# Patient Record
Sex: MALE | Race: BLACK OR AFRICAN AMERICAN | NOT HISPANIC OR LATINO | Employment: UNEMPLOYED | URBAN - METROPOLITAN AREA
[De-identification: names, ages, dates, MRNs, and addresses within clinical notes are randomized per-mention and may not be internally consistent; named-entity substitution may affect disease eponyms.]

---

## 2023-05-11 ENCOUNTER — HOSPITAL ENCOUNTER (EMERGENCY)
Facility: HOSPITAL | Age: 33
Discharge: HOME | End: 2023-05-11
Attending: EMERGENCY MEDICINE
Payer: MEDICAID

## 2023-05-11 VITALS
HEIGHT: 61 IN | SYSTOLIC BLOOD PRESSURE: 141 MMHG | WEIGHT: 230 LBS | RESPIRATION RATE: 18 BRPM | DIASTOLIC BLOOD PRESSURE: 96 MMHG | OXYGEN SATURATION: 98 % | BODY MASS INDEX: 43.43 KG/M2 | TEMPERATURE: 98 F | HEART RATE: 88 BPM

## 2023-05-11 DIAGNOSIS — N39.0 URINARY TRACT INFECTION WITHOUT HEMATURIA, SITE UNSPECIFIED: ICD-10-CM

## 2023-05-11 DIAGNOSIS — N50.89 TESTICULAR SWELLING: ICD-10-CM

## 2023-05-11 DIAGNOSIS — N49.2 CELLULITIS OF SCROTUM: Primary | ICD-10-CM

## 2023-05-11 LAB
BILIRUB UR QL STRIP: NEGATIVE
BILIRUB UR QL STRIP: NEGATIVE
CLARITY UR: CLEAR
CLARITY UR: CLEAR
COLOR UR: COLORLESS
COLOR UR: COLORLESS
GLUCOSE UR QL STRIP: NEGATIVE
GLUCOSE UR QL STRIP: NEGATIVE
HGB UR QL STRIP: NEGATIVE
HGB UR QL STRIP: NEGATIVE
KETONES UR QL STRIP: NEGATIVE
KETONES UR QL STRIP: NEGATIVE
LEUKOCYTE ESTERASE UR QL STRIP: ABNORMAL
LEUKOCYTE ESTERASE UR QL STRIP: ABNORMAL
MICROSCOPIC COMMENT: ABNORMAL
NITRITE UR QL STRIP: NEGATIVE
NITRITE UR QL STRIP: NEGATIVE
PH UR STRIP: 6 [PH] (ref 5–8)
PH UR STRIP: 6 [PH] (ref 5–8)
PROT UR QL STRIP: NEGATIVE
PROT UR QL STRIP: NEGATIVE
SP GR UR STRIP: <1.005 (ref 1–1.03)
SP GR UR STRIP: <1.005 (ref 1–1.03)
URN SPEC COLLECT METH UR: ABNORMAL
URN SPEC COLLECT METH UR: ABNORMAL
UROBILINOGEN UR STRIP-ACNC: NEGATIVE EU/DL
UROBILINOGEN UR STRIP-ACNC: NEGATIVE EU/DL
WBC #/AREA URNS HPF: 12 /HPF (ref 0–5)
WBC CLUMPS URNS QL MICRO: ABNORMAL

## 2023-05-11 PROCEDURE — 81000 URINALYSIS NONAUTO W/SCOPE: CPT | Performed by: EMERGENCY MEDICINE

## 2023-05-11 PROCEDURE — 87086 URINE CULTURE/COLONY COUNT: CPT | Performed by: EMERGENCY MEDICINE

## 2023-05-11 PROCEDURE — 99285 EMERGENCY DEPT VISIT HI MDM: CPT

## 2023-05-11 PROCEDURE — 87591 N.GONORRHOEAE DNA AMP PROB: CPT | Performed by: EMERGENCY MEDICINE

## 2023-05-11 PROCEDURE — 96372 THER/PROPH/DIAG INJ SC/IM: CPT

## 2023-05-11 PROCEDURE — 63600175 PHARM REV CODE 636 W HCPCS

## 2023-05-11 RX ORDER — CEFTRIAXONE 500 MG/1
500 INJECTION, POWDER, FOR SOLUTION INTRAMUSCULAR; INTRAVENOUS
Status: COMPLETED | OUTPATIENT
Start: 2023-05-11 | End: 2023-05-11

## 2023-05-11 RX ORDER — CEPHALEXIN 500 MG/1
500 CAPSULE ORAL 4 TIMES DAILY
Qty: 28 CAPSULE | Refills: 0 | Status: SHIPPED | OUTPATIENT
Start: 2023-05-11 | End: 2023-05-18

## 2023-05-11 RX ORDER — IBUPROFEN 600 MG/1
600 TABLET ORAL EVERY 6 HOURS PRN
Qty: 20 TABLET | Refills: 0 | Status: SHIPPED | OUTPATIENT
Start: 2023-05-11

## 2023-05-11 RX ORDER — ACETAMINOPHEN 500 MG
500 TABLET ORAL EVERY 6 HOURS PRN
Qty: 20 TABLET | Refills: 0 | Status: SHIPPED | OUTPATIENT
Start: 2023-05-11

## 2023-05-11 RX ADMIN — CEFTRIAXONE 500 MG: 500 INJECTION, POWDER, FOR SOLUTION INTRAMUSCULAR; INTRAVENOUS at 11:05

## 2023-05-11 NOTE — Clinical Note
"Tulio Haleuyen Egan was seen and treated in our emergency department on 5/11/2023.  He may return to work on 05/15/2023.       If you have any questions or concerns, please don't hesitate to call.      Alayna Holdsworth, PA-C"

## 2023-05-11 NOTE — ED PROVIDER NOTES
"Encounter Date: 5/11/2023    SCRIBE #1 NOTE: I, Cinthya Cande Hatch, am scribing for, and in the presence of,  Alayna Holdsworth, PA-C. I have scribed the following portions of the note - Other sections scribed: HPI, ROS.     History     Chief Complaint   Patient presents with    Cyst     Pt c/o cyst to right scrotum x 3-4 days. Pt states he attempted to drain cyst yesterday with no relief. Pt reports urinary urgency     32 yr old male with no pertinent PMHx presents to the ED for a chief complaint of cyst to right scrotum onset 4 days ago. Patient describes the pain as a "throbbing pain" and rates the pain 8/10. The pain is exacerbating with movement. Patient states he attempted to drain cyst yesterday with no relief. No other attempted treatments for the symptom. Patient reports he has experiences similar symptoms before from cysts in the past. Patient reports current swelling of testicles bilaterally, and swelling with alleviation PTA of his genital. Patient endorses frequency and urgent urination, and purulent once. Patient denies headache, abdominal pain,  nausea, vomiting, diarrhea, chest pain, shortness of breath, dysuria, decreased urine, fever, dizziness, lightheadedness, or other associated symptoms. No other alleviating or exacerbating factors. NKDA.         The history is provided by the patient. No  was used.   Review of patient's allergies indicates:  No Known Allergies  History reviewed. No pertinent past medical history.  History reviewed. No pertinent surgical history.  History reviewed. No pertinent family history.     Review of Systems   Constitutional:  Negative for chills, fatigue and fever.   Respiratory:  Negative for shortness of breath.    Cardiovascular:  Negative for chest pain.   Gastrointestinal:  Negative for abdominal pain, constipation, diarrhea, nausea and vomiting.   Genitourinary:  Positive for frequency, scrotal swelling (bilateral) and urgency. Negative for " decreased urine volume, difficulty urinating, dysuria, penile discharge, penile pain, penile swelling and testicular pain.        (+) purulent   Musculoskeletal:  Negative for back pain and myalgias.   Skin:  Negative for rash.   Neurological:  Negative for dizziness, weakness, light-headedness and headaches.     Physical Exam     Initial Vitals [05/11/23 0900]   BP Pulse Resp Temp SpO2   (!) 157/101 99 18 98.7 °F (37.1 °C) 100 %      MAP       --         Physical Exam    Nursing note and vitals reviewed.  Constitutional: He appears well-developed and well-nourished. He is not diaphoretic. He is active. He does not appear ill. No distress.   HENT:   Head: Normocephalic and atraumatic.   Right Ear: External ear normal.   Left Ear: External ear normal.   Nose: Nose normal.   Eyes: Conjunctivae, EOM and lids are normal. Pupils are equal, round, and reactive to light. Right eye exhibits no discharge. Left eye exhibits no discharge. No scleral icterus.   Neck: Neck supple.   Normal range of motion.   Full passive range of motion without pain.     Cardiovascular:  Normal rate and regular rhythm.           Pulmonary/Chest: Effort normal and breath sounds normal. No respiratory distress.   Abdominal: Abdomen is soft. He exhibits no distension. There is no abdominal tenderness.   Genitourinary:    Penis normal.   Right testis shows swelling and tenderness (open wound to right scrotum with no palpated abscess or fluid collection). Left testis shows swelling. Left testis shows no tenderness. Uncircumcised. No penile erythema or penile tenderness. No discharge found.       Musculoskeletal:         General: Normal range of motion.      Cervical back: Full passive range of motion without pain, normal range of motion and neck supple.     Neurological: He is alert and oriented to person, place, and time. He has normal strength. No sensory deficit. GCS eye subscore is 4. GCS verbal subscore is 5. GCS motor subscore is 6.   Skin:  Skin is dry. Capillary refill takes less than 2 seconds.       ED Course   Procedures  Labs Reviewed   URINALYSIS, REFLEX TO URINE CULTURE - Abnormal; Notable for the following components:       Result Value    Color, UA Colorless (*)     Specific Gravity, UA <1.005 (*)     Leukocytes, UA 2+ (*)     All other components within normal limits    Narrative:     Specimen Source->Urine   URINALYSIS, REFLEX TO URINE CULTURE - Abnormal; Notable for the following components:    Color, UA Colorless (*)     Specific Gravity, UA <1.005 (*)     Leukocytes, UA 2+ (*)     All other components within normal limits    Narrative:     Specimen Source->Urine   URINALYSIS MICROSCOPIC - Abnormal; Notable for the following components:    WBC, UA 12 (*)     All other components within normal limits    Narrative:     Specimen Source->Urine   C. TRACHOMATIS/N. GONORRHOEAE BY AMP DNA   CULTURE, URINE   C. TRACHOMATIS/N. GONORRHOEAE BY AMP DNA          Imaging Results              US Scrotum And Testicles (Final result)  Result time 05/11/23 12:20:09      Final result by Thiago Lundy MD (05/11/23 12:20:09)                   Impression:      Diffuse scrotal wall thickening, correlation is advised, may reflect cellulitis.    No findings to suggest epididymo-orchitis.    Small bilateral varicocele.      Electronically signed by: Thiago Lundy MD  Date:    05/11/2023  Time:    12:20               Narrative:    EXAMINATION:  US SCROTUM AND TESTICLES    CLINICAL HISTORY:  Other specified disorders of the male genital organs    TECHNIQUE:  Sonography of the scrotum and testes.    COMPARISON:  None.    FINDINGS:  The right testicle measures approximately 3.0 x 3.0 x 4.5 cm with homogeneous echotexture.  Arterial and venous flow is documented to the right testicle without abnormal testicular or epididymal hyperemia.  The right epididymis is unremarkable.  There is a small right varicocele.  No right hydrocele.    The left testicle measures  approximately 3.4 x 3.3 x 4.7 cm with homogeneous echotexture.  Arterial and venous flow is documented to the left testicle without abnormal testicular or epididymal hyperemia.  The left epididymis is unremarkable.  There is a small left varicocele.  No left hydrocele.    There is diffuse scrotal wall thickening and wall hyperemia.                                       Medications   cefTRIAXone injection 500 mg (500 mg Intramuscular Given 5/11/23 1158)     Medical Decision Making:   History:   Old Medical Records: I decided to obtain old medical records.  Initial Assessment:   32 yr old male with no pertinent PMHx presents to the ED for a chief complaint of cyst to right scrotum.  Patient's chart and medical history reviewed.  Differential Diagnosis:   UTI  Gonorrhea  Chlamydia  Epididymitis   Testicular torsion   Hydrocele  Variocele  Hernia  Cyst  Abscess  Clinical Tests:   Lab Tests: Ordered and Reviewed  Radiological Study: Reviewed and Ordered  ED Management:  Patient's vitals reviewed.  He is afebrile, no respiratory distress, nontoxic-appearing in the ED. Patient had bilateral testicular swelling with a right-sided open wound on his scrotum.  No fluid collection or abscess could be palpated. Penis was normal.  Patient denied pain medication. UA was remarkable for UTI. Patient will be sent home on Keflex.  Discussed with him a urine culture will be performed and if anything grows that is not covered by this antibiotic he will be called and an appropriate antibiotic will be prescribed.  He verbalized understanding.  GC pending.  Patient given Rocephin in the ED. Testicular ultrasound showed Diffuse scrotal wall thickening, correlation is advised, may reflect cellulitis. No findings to suggest epididymo-orchitis. Small bilateral varicocele.  Discussed this case with Dr. Esquivel.  We will take patient for scrotal cellulitis.  Patient will be sent home with Keflex.  Patient will also be sent home on Motrin and  Tylenol as needed for pain.  Instructed patient to do warm compresses to promote further drainage and ice to help with swelling, he verbalized understanding.  Instructed patient to keep the area clean and dry and watch out for worsening signs of infection including erythema, warmth, pus discharge, and fevers at home. Patient agrees with this plan. Discussed with him strict return precautions, he verbalized understanding. Patient is stable for discharge.           Scribe Attestation:   Scribe #1: I performed the above scribed service and the documentation accurately describes the services I performed. I attest to the accuracy of the note.                 ICinthya, scribed for, and in the presence of, Alayna Holdsworth,PA-C. I performed the scribed service and the documentation accurately describes the services I performed. I attest to the accuracy of the note.    Clinical Impression:   Final diagnoses:  [N50.89] Testicular swelling  [N49.2] Cellulitis of scrotum (Primary)  [N39.0] Urinary tract infection without hematuria, site unspecified        ED Disposition Condition    Discharge Stable          ED Prescriptions       Medication Sig Dispense Start Date End Date Auth. Provider    ibuprofen (ADVIL,MOTRIN) 600 MG tablet Take 1 tablet (600 mg total) by mouth every 6 (six) hours as needed for Pain. 20 tablet 5/11/2023 -- Alayna Holdsworth, PA-C    acetaminophen (TYLENOL) 500 MG tablet Take 1 tablet (500 mg total) by mouth every 6 (six) hours as needed for Temperature greater than or Pain. 20 tablet 5/11/2023 -- Alayna Holdsworth, PA-C    cephALEXin (KEFLEX) 500 MG capsule Take 1 capsule (500 mg total) by mouth 4 (four) times daily. for 7 days 28 capsule 5/11/2023 5/18/2023 Alayna Holdsworth, PA-C          Follow-up Information       Follow up With Specialties Details Why Contact Info    [facility] [address]  [place] [address]  [phone]               Alayna Holdsworth, PA-C  05/11/23  [id number]

## 2023-05-11 NOTE — DISCHARGE INSTRUCTIONS

## 2023-05-11 NOTE — ED TRIAGE NOTES
Pt c/o cyst to right scrotum x 3-4 days. Pt states he attempted to drain cyst yesterday with no relief. Pt reports urinary urgency

## 2023-05-12 LAB
C TRACH DNA SPEC QL NAA+PROBE: NOT DETECTED
N GONORRHOEA DNA SPEC QL NAA+PROBE: NOT DETECTED

## 2023-05-13 LAB — BACTERIA UR CULT: NO GROWTH

## 2023-10-19 ENCOUNTER — OFFICE VISIT (OUTPATIENT)
Dept: FAMILY MEDICINE | Facility: CLINIC | Age: 33
End: 2023-10-19
Payer: COMMERCIAL

## 2023-10-19 VITALS
TEMPERATURE: 99 F | SYSTOLIC BLOOD PRESSURE: 154 MMHG | WEIGHT: 231.25 LBS | BODY MASS INDEX: 43.7 KG/M2 | DIASTOLIC BLOOD PRESSURE: 104 MMHG | OXYGEN SATURATION: 95 % | HEART RATE: 94 BPM

## 2023-10-19 DIAGNOSIS — Z11.4 ENCOUNTER FOR SCREENING FOR HIV: ICD-10-CM

## 2023-10-19 DIAGNOSIS — L02.214 GROIN ABSCESS: Primary | ICD-10-CM

## 2023-10-19 DIAGNOSIS — L73.2 HIDRADENITIS SUPPURATIVA OF MULTIPLE SITES: ICD-10-CM

## 2023-10-19 DIAGNOSIS — Z11.59 ENCOUNTER FOR HCV SCREENING TEST FOR LOW RISK PATIENT: ICD-10-CM

## 2023-10-19 DIAGNOSIS — Z11.3 ROUTINE SCREENING FOR STI (SEXUALLY TRANSMITTED INFECTION): ICD-10-CM

## 2023-10-19 PROCEDURE — 3077F SYST BP >= 140 MM HG: CPT | Mod: CPTII,S$GLB,, | Performed by: STUDENT IN AN ORGANIZED HEALTH CARE EDUCATION/TRAINING PROGRAM

## 2023-10-19 PROCEDURE — 1159F PR MEDICATION LIST DOCUMENTED IN MEDICAL RECORD: ICD-10-PCS | Mod: CPTII,S$GLB,, | Performed by: STUDENT IN AN ORGANIZED HEALTH CARE EDUCATION/TRAINING PROGRAM

## 2023-10-19 PROCEDURE — 3080F PR MOST RECENT DIASTOLIC BLOOD PRESSURE >= 90 MM HG: ICD-10-PCS | Mod: CPTII,S$GLB,, | Performed by: STUDENT IN AN ORGANIZED HEALTH CARE EDUCATION/TRAINING PROGRAM

## 2023-10-19 PROCEDURE — 1159F MED LIST DOCD IN RCRD: CPT | Mod: CPTII,S$GLB,, | Performed by: STUDENT IN AN ORGANIZED HEALTH CARE EDUCATION/TRAINING PROGRAM

## 2023-10-19 PROCEDURE — 3080F DIAST BP >= 90 MM HG: CPT | Mod: CPTII,S$GLB,, | Performed by: STUDENT IN AN ORGANIZED HEALTH CARE EDUCATION/TRAINING PROGRAM

## 2023-10-19 PROCEDURE — 3008F PR BODY MASS INDEX (BMI) DOCUMENTED: ICD-10-PCS | Mod: CPTII,S$GLB,, | Performed by: STUDENT IN AN ORGANIZED HEALTH CARE EDUCATION/TRAINING PROGRAM

## 2023-10-19 PROCEDURE — 99999 PR PBB SHADOW E&M-EST. PATIENT-LVL III: CPT | Mod: PBBFAC,,, | Performed by: STUDENT IN AN ORGANIZED HEALTH CARE EDUCATION/TRAINING PROGRAM

## 2023-10-19 PROCEDURE — 99204 PR OFFICE/OUTPT VISIT, NEW, LEVL IV, 45-59 MIN: ICD-10-PCS | Mod: S$GLB,,, | Performed by: STUDENT IN AN ORGANIZED HEALTH CARE EDUCATION/TRAINING PROGRAM

## 2023-10-19 PROCEDURE — 3008F BODY MASS INDEX DOCD: CPT | Mod: CPTII,S$GLB,, | Performed by: STUDENT IN AN ORGANIZED HEALTH CARE EDUCATION/TRAINING PROGRAM

## 2023-10-19 PROCEDURE — 99999 PR PBB SHADOW E&M-EST. PATIENT-LVL III: ICD-10-PCS | Mod: PBBFAC,,, | Performed by: STUDENT IN AN ORGANIZED HEALTH CARE EDUCATION/TRAINING PROGRAM

## 2023-10-19 PROCEDURE — 3077F PR MOST RECENT SYSTOLIC BLOOD PRESSURE >= 140 MM HG: ICD-10-PCS | Mod: CPTII,S$GLB,, | Performed by: STUDENT IN AN ORGANIZED HEALTH CARE EDUCATION/TRAINING PROGRAM

## 2023-10-19 PROCEDURE — 99204 OFFICE O/P NEW MOD 45 MIN: CPT | Mod: S$GLB,,, | Performed by: STUDENT IN AN ORGANIZED HEALTH CARE EDUCATION/TRAINING PROGRAM

## 2023-10-19 RX ORDER — DOXYCYCLINE 100 MG/1
100 CAPSULE ORAL 2 TIMES DAILY
Qty: 14 CAPSULE | Refills: 0 | Status: SHIPPED | OUTPATIENT
Start: 2023-10-19 | End: 2023-10-26

## 2023-10-19 RX ORDER — UREA 40 %
CREAM (GRAM) TOPICAL 2 TIMES DAILY
COMMUNITY
Start: 2023-07-02

## 2023-10-19 RX ORDER — MUPIROCIN 20 MG/G
OINTMENT TOPICAL 3 TIMES DAILY
Qty: 30 G | Refills: 0 | Status: SHIPPED | OUTPATIENT
Start: 2023-10-19 | End: 2023-11-18

## 2023-10-19 RX ORDER — BUPROPION HYDROCHLORIDE 150 MG/1
TABLET, EXTENDED RELEASE ORAL
COMMUNITY
Start: 2023-04-12

## 2023-10-19 RX ORDER — TERBINAFINE HYDROCHLORIDE 250 MG/1
250 TABLET ORAL
COMMUNITY
Start: 2023-10-18

## 2023-10-19 NOTE — PROGRESS NOTES
Health Maintenance Due   Topic     Hepatitis C Screening  Consult pcp    COVID-19 Vaccine (1) Not offered at this office    HIV Screening  Consult pcp    TETANUS VACCINE  Pt decline    Influenza Vaccine (1) Pt decline

## 2023-10-19 NOTE — PROGRESS NOTES
SUBJECTIVE     Chief Complaint   Patient presents with    Cyst     Have been having cysts on skin all over for years and don't know where they are coming from    Establish Care       HPI  Tulio Egan is a 32 y.o. male with no significant past medical history that presents for establishment of care and evaluation of cysts.    Pt reports recurrent grin abscesses. He gets them on his scrotum and occasionally his gluteal crease, once his armpit.     Normal labs in January of this year.   Works at [employer]. Stays with girlfriend.     PAST MEDICAL HISTORY:  No past medical history on file.    ALLERGIES AND MEDICATIONS: updated and reviewed.  Review of patient's allergies indicates:  No Known Allergies  Current Outpatient Medications   Medication Sig Dispense Refill    acetaminophen (TYLENOL) 500 MG tablet Take 1 tablet (500 mg total) by mouth every 6 (six) hours as needed for Temperature greater than or Pain. 20 tablet 0    ibuprofen (ADVIL,MOTRIN) 600 MG tablet Take 1 tablet (600 mg total) by mouth every 6 (six) hours as needed for Pain. 20 tablet 0    terbinafine HCL (LAMISIL) 250 mg tablet Take 250 mg by mouth.      urea (CARMOL) 40 % Crea Apply topically 2 (two) times daily.      buPROPion (WELLBUTRIN SR) 150 MG TBSR 12 hr tablet Take one tab daily for three days then twice daily x 7-12 weeks      doxycycline (VIBRAMYCIN) 100 MG Cap Take 1 capsule (100 mg total) by mouth 2 (two) times daily. for 7 days 14 capsule 0    mupirocin (BACTROBAN) 2 % ointment Apply topically 3 (three) times daily. 30 g 0     No current facility-administered medications for this visit.       ROS  Review of Systems   Constitutional:  Negative for chills, fatigue and fever.   HENT:  Negative for rhinorrhea and sore throat.    Respiratory:  Negative for cough and shortness of breath.    Cardiovascular:  Negative for chest pain and palpitations.   Gastrointestinal:  Negative for constipation, diarrhea, nausea and vomiting.   Genitourinary:   Negative for dysuria.   Musculoskeletal:  Negative for joint swelling.   Skin:  Negative for rash and wound.        Abscess     Neurological:  Negative for light-headedness and headaches.   Psychiatric/Behavioral:  Negative for dysphoric mood and sleep disturbance. The patient is not nervous/anxious.          OBJECTIVE     Physical Exam  Vitals:    10/19/23 1530   BP: (!) 154/104   Pulse: 94   Temp: 98.9 °F (37.2 °C)    Body mass index is 43.7 kg/m².  Weight: 104.9 kg (231 lb 4.2 oz)         Physical Exam  Vitals reviewed.   Constitutional:       General: He is not in acute distress.  HENT:      Right Ear: External ear normal.      Left Ear: External ear normal.      Nose: Nose normal.      Mouth/Throat:      Mouth: Mucous membranes are moist.   Eyes:      Extraocular Movements: Extraocular movements intact.      Conjunctiva/sclera: Conjunctivae normal.      Pupils: Pupils are equal, round, and reactive to light.   Pulmonary:      Effort: Pulmonary effort is normal.   Abdominal:      General: There is no distension.      Palpations: Abdomen is soft.   Musculoskeletal:         General: No swelling. Normal range of motion.      Cervical back: Normal range of motion.   Skin:     General: Skin is warm and dry.      Findings: Lesion present. No rash.   Neurological:      General: No focal deficit present.      Mental Status: He is alert and oriented to person, place, and time.   Psychiatric:         Mood and Affect: Mood normal.         Behavior: Behavior normal.           Health Maintenance         Date Due Completion Date    Hepatitis C Screening Never done ---    COVID-19 Vaccine (1) Never done ---    Pneumococcal Vaccines (Age 0-64) (1 - PCV) Never done ---    HIV Screening Never done ---    TETANUS VACCINE Never done ---    Influenza Vaccine (1) Never done ---              ASSESSMENT     32 y.o. male with     1. Groin abscess    2. Hidradenitis suppurativa of multiple sites    3. Encounter for screening for HIV     4. Encounter for HCV screening test for low risk patient    5. Routine screening for STI (sexually transmitted infection)        PLAN:     1. Groin abscess  - New. Start doxy and warm compresses. F/u 1 week if not resolved.  - Hemoglobin A1C; Future  - Lipid Panel; Future  - TSH; Future  - Comprehensive Metabolic Panel; Future  - CBC Auto Differential; Future  - HIV 1/2 Ag/Ab (4th Gen); Future  - Hepatitis C Antibody; Future  - C. trachomatis/N. gonorrhoeae by AMP DNA [facility]; Urine; Future  - RPR; Future  - Hepatitis B Surface Antigen; Future  - doxycycline (VIBRAMYCIN) 100 MG Cap; Take 1 capsule (100 mg total) by mouth 2 (two) times daily. for 7 days  Dispense: 14 capsule; Refill: 0  - mupirocin (BACTROBAN) 2 % ointment; Apply topically 3 (three) times daily.  Dispense: 30 g; Refill: 0    2. Hidradenitis suppurativa of multiple sites  - New diagnosis. Start mupirocin PRN and warm compresses. RTC if worsening and consider ppx.     3. Encounter for screening for HIV  - Due, ordered.  - HIV 1/2 Ag/Ab (4th Gen); Future    4. Encounter for HCV screening test for low risk patient  - Due, ordered.  - Hepatitis C Antibody; Future    5. Routine screening for STI (sexually transmitted infection)  - Due, ordered.  - HIV 1/2 Ag/Ab (4th Gen); Future  - Hepatitis C Antibody; Future  - C. trachomatis/N. gonorrhoeae by AMP DNA [facility]; Urine; Future  - RPR; Future  - Hepatitis B Surface Antigen; Future        Jocelyn Vincent MD  10/19/2023 3:38 PM        Follow up in about 3 months (around 1/19/2024).

## 2024-05-09 ENCOUNTER — OFFICE VISIT (OUTPATIENT)
Dept: FAMILY MEDICINE | Facility: CLINIC | Age: 34
End: 2024-05-09
Payer: COMMERCIAL

## 2024-05-09 VITALS
SYSTOLIC BLOOD PRESSURE: 134 MMHG | DIASTOLIC BLOOD PRESSURE: 78 MMHG | RESPIRATION RATE: 16 BRPM | HEIGHT: 61 IN | HEART RATE: 96 BPM | OXYGEN SATURATION: 96 % | WEIGHT: 232.13 LBS | BODY MASS INDEX: 43.83 KG/M2 | TEMPERATURE: 99 F

## 2024-05-09 DIAGNOSIS — B35.1 ONYCHOMYCOSIS: ICD-10-CM

## 2024-05-09 DIAGNOSIS — Z11.3 SCREEN FOR STD (SEXUALLY TRANSMITTED DISEASE): ICD-10-CM

## 2024-05-09 DIAGNOSIS — Z00.00 ANNUAL PHYSICAL EXAM: Primary | ICD-10-CM

## 2024-05-09 DIAGNOSIS — Z23 NEED FOR PNEUMOCOCCAL 20-VALENT CONJUGATE VACCINATION: ICD-10-CM

## 2024-05-09 DIAGNOSIS — Z23 NEED FOR DIPHTHERIA-TETANUS-PERTUSSIS (TDAP) VACCINE: ICD-10-CM

## 2024-05-09 DIAGNOSIS — Z11.59 NEED FOR HEPATITIS C SCREENING TEST: ICD-10-CM

## 2024-05-09 DIAGNOSIS — Z72.0 TOBACCO USE: ICD-10-CM

## 2024-05-09 PROCEDURE — 90471 IMMUNIZATION ADMIN: CPT | Mod: S$GLB,,, | Performed by: FAMILY MEDICINE

## 2024-05-09 PROCEDURE — 3078F DIAST BP <80 MM HG: CPT | Mod: CPTII,S$GLB,, | Performed by: FAMILY MEDICINE

## 2024-05-09 PROCEDURE — 3008F BODY MASS INDEX DOCD: CPT | Mod: CPTII,S$GLB,, | Performed by: FAMILY MEDICINE

## 2024-05-09 PROCEDURE — 90677 PCV20 VACCINE IM: CPT | Mod: S$GLB,,, | Performed by: FAMILY MEDICINE

## 2024-05-09 PROCEDURE — 99406 BEHAV CHNG SMOKING 3-10 MIN: CPT | Mod: 25,S$GLB,, | Performed by: FAMILY MEDICINE

## 2024-05-09 PROCEDURE — 90715 TDAP VACCINE 7 YRS/> IM: CPT | Mod: S$GLB,,, | Performed by: FAMILY MEDICINE

## 2024-05-09 PROCEDURE — 99999 PR PBB SHADOW E&M-EST. PATIENT-LVL III: CPT | Mod: PBBFAC,,, | Performed by: FAMILY MEDICINE

## 2024-05-09 PROCEDURE — 90472 IMMUNIZATION ADMIN EACH ADD: CPT | Mod: S$GLB,,, | Performed by: FAMILY MEDICINE

## 2024-05-09 PROCEDURE — 99395 PREV VISIT EST AGE 18-39: CPT | Mod: 25,S$GLB,, | Performed by: FAMILY MEDICINE

## 2024-05-09 PROCEDURE — 3075F SYST BP GE 130 - 139MM HG: CPT | Mod: CPTII,S$GLB,, | Performed by: FAMILY MEDICINE

## 2024-05-09 RX ORDER — IBUPROFEN 800 MG/1
800 TABLET ORAL EVERY 6 HOURS PRN
COMMUNITY
Start: 2024-01-04

## 2024-05-09 RX ORDER — VARENICLINE TARTRATE 0.5 (11)-1
KIT ORAL
Qty: 1 EACH | Refills: 0 | Status: SHIPPED | OUTPATIENT
Start: 2024-05-09

## 2024-05-09 NOTE — PROGRESS NOTES
Administered Tdap IM to right deltoid.  Patient tolerated injection well, no adverse reactions noted.   Administered Pneumococcal 20 vaccine IM to right deltoid.  Patient tolerated injection well, no adverse reactions noted.

## 2024-05-09 NOTE — PROGRESS NOTES
Health Maintenance Due   Topic     Hepatitis C Screening  CONSULT WITH PCP    Pneumococcal Vaccines (Age 0-64) (1 of 2 - PCV) PATIENT DECLINE     HIV Screening  CONSULT WITH PCP    TETANUS VACCINE  CONSULT WITH PCP    COVID-19 Vaccine (1 - 2023-24 season) Not given at this facility

## 2024-05-20 ENCOUNTER — CLINICAL SUPPORT (OUTPATIENT)
Dept: SMOKING CESSATION | Facility: CLINIC | Age: 34
End: 2024-05-20
Payer: COMMERCIAL

## 2024-05-20 DIAGNOSIS — F17.200 NICOTINE DEPENDENCE: Primary | ICD-10-CM

## 2024-05-20 PROCEDURE — 99999 PR PBB SHADOW E&M-EST. PATIENT-LVL I: CPT | Mod: PBBFAC,,,

## 2024-05-20 RX ORDER — DM/P-EPHED/ACETAMINOPH/DOXYLAM 30-7.5/3
2 LIQUID (ML) ORAL
Qty: 72 LOZENGE | Refills: 0 | Status: SHIPPED | OUTPATIENT
Start: 2024-05-20

## 2024-05-20 NOTE — PROGRESS NOTES
Patient was seen in clinic today and reports smoking 1-1.5 packs of cigarettes per day. He states that he is already on a tobacco cessation medication regimen of 1mg Chantix bid, given to him by his physician. He will also begin using 2mg nicotine lozenges. Patient reports not smoking in his home but does smoke outside, oftentimes chain smoking. Suggested that patient only take one cigarette out with him at a time and waiting at least one hour between each cigarettes. Encouraged patient to only purchase one pack of cigarettes at a time. Discussed other tips and strategies to assist with quit. The patient will continue with  therapy sessions and medication monitoring by [facility]. Prescribed medication management will be by physician.

## 2024-05-31 NOTE — PROGRESS NOTES
Subjective:       Patient ID: Tulio Egan is a 33 y.o. male.    Chief Complaint: No chief complaint on file.      HPI  33-year-old male presents for annual exam states he has discoloration in his nail.    Review of Systems   Constitutional: Negative.    HENT: Negative.     Respiratory: Negative.     Cardiovascular: Negative.    Gastrointestinal: Negative.    Endocrine: Negative.    Genitourinary: Negative.    Musculoskeletal: Negative.    Neurological: Negative.    Psychiatric/Behavioral: Negative.            History reviewed. No pertinent past medical history.  History reviewed. No pertinent surgical history.  No family history on file.  Social History     Socioeconomic History    Marital status: Single   Tobacco Use    Smoking status: Every Day     Types: Cigarettes     Passive exposure: Never    Smokeless tobacco: Never     Social Determinants of Health     Financial Resource Strain: Low Risk  (3/20/2023)    [place] [facility]    Overall Financial Resource Strain (CARDIA)     Difficulty of Paying Living Expenses: Not hard at all   Food Insecurity: No Food Insecurity (3/20/2023)    Received [place] [facility]    Hunger Vital Sign     Worried About Running Out of Food in the Last Year: Never true     Ran Out of Food in the Last Year: Never true   Transportation Needs: No Transportation Needs (3/20/2023)    Received from [facility]    PRAPARE - Transportation     Lack of Transportation (Medical): No     Lack of Transportation (Non-Medical): No   Physical Activity: Inactive (3/20/2023)    Received from [facility]    Exercise Vital Sign     Days of Exercise per Week: 0 days     Minutes of Exercise per Session: 0 min   Stress: No Stress Concern Present (3/20/2023)    Received from [facility]    [employer] [place] [employer] - Occupational Stress Questionnaire     Feeling of Stress : Not at all   Housing Stability: Low Risk   "(3/20/2023)    [facility]    Housing Stability Vital Sign     Unable to Pay for Housing in the Last Year: No     Number of Places Lived in the Last Year: 1     In the last 12 months, was there a time when you did not have a steady place to sleep or slept in a shelter (including now)?: No       Current Outpatient Medications:     acetaminophen (TYLENOL) 500 MG tablet, Take 1 tablet (500 mg total) by mouth every 6 (six) hours as needed for Temperature greater than or Pain., Disp: 20 tablet, Rfl: 0    buPROPion (WELLBUTRIN SR) 150 MG TBSR 12 hr tablet, Take one tab daily for three days then twice daily x 7-12 weeks, Disp: , Rfl:     ibuprofen (ADVIL,MOTRIN) 600 MG tablet, Take 1 tablet (600 mg total) by mouth every 6 (six) hours as needed for Pain., Disp: 20 tablet, Rfl: 0    ibuprofen (ADVIL,MOTRIN) 800 MG tablet, Take 800 mg by mouth every 6 (six) hours as needed., Disp: , Rfl:     terbinafine HCL (LAMISIL) 250 mg tablet, Take 250 mg by mouth., Disp: , Rfl:     urea (CARMOL) 40 % Crea, Apply topically 2 (two) times daily., Disp: , Rfl:     nicotine polacrilex 2 MG Lozg, Take 1 lozenge (2 mg total) by mouth as needed (in place of a cigarette)., Disp: 72 lozenge, Rfl: 0    varenicline (CHANTIX STARTING MONTH BOX) 0.5 mg (11)- 1 mg (42) tablet, Take one 0.5mg tab by mouth once daily X3 days,then increase to one 0.5mg tab twice daily X4 days,then increase to one 1mg tab twice daily, Disp: 1 each, Rfl: 0   Objective:      Vitals:    05/09/24 1341   BP: 134/78   BP Location: Left arm   Patient Position: Sitting   BP Method: Large (Manual)   Pulse: 96   Resp: 16   Temp: 98.5 °F (36.9 °C)   TempSrc: Oral   SpO2: 96%   Weight: 105.3 kg (232 lb 2.3 oz)   Height: 5' 1" (1.549 m)       Physical Exam  Constitutional:       General: He is not in acute distress.  HENT:      Head: Normocephalic and atraumatic.   Eyes:      Conjunctiva/sclera: Conjunctivae normal.   Cardiovascular:      Rate and Rhythm: " Normal rate and regular rhythm.      Heart sounds: Normal heart sounds. No murmur heard.     No friction rub. No gallop.   Pulmonary:      Effort: Pulmonary effort is normal.      Breath sounds: Normal breath sounds. No wheezing or rales.   Musculoskeletal:      Cervical back: Neck supple.   Skin:     General: Skin is warm and dry.   Neurological:      Mental Status: He is alert and oriented to person, place, and time.   Psychiatric:         Behavior: Behavior normal.         Thought Content: Thought content normal.         Judgment: Judgment normal.            Assessment:       1. Annual physical exam    2. Onychomycosis    3. Screen for STD (sexually transmitted disease)    4. Need for hepatitis C screening test    5. Tobacco use    6. Need for diphtheria-tetanus-pertussis (Tdap) vaccine    7. Need for pneumococcal 20-valent conjugate vaccination        Plan:       Annual physical exam  -     CBC Auto Differential; Future; Expected date: 05/09/2024  -     Comprehensive Metabolic Panel; Future; Expected date: 05/09/2024  -     Hemoglobin A1C; Future; Expected date: 05/09/2024  -     TSH; Future; Expected date: 05/09/2024  -     Lipid Panel; Future; Expected date: 05/09/2024  -     HIV 1/2 Ag/Ab (4th Gen); Future; Expected date: 05/09/2024  -     Treponema Pallidium Antibodies IgG, IgM; Future; Expected date: 05/09/2024  -     Cancel: C. trachomatis/N. gonorrhoeae by AMP DNA [facility]; Urine; Future; Expected date: 05/09/2024  -     C. trachomatis/N. gonorrhoeae by AMP DNA [facility]; Urine; Future; Expected date: 05/09/2024    Onychomycosis    Screen for STD (sexually transmitted disease)  -     HIV 1/2 Ag/Ab (4th Gen); Future; Expected date: 05/09/2024  -     Treponema Pallidium Antibodies IgG, IgM; Future; Expected date: 05/09/2024  -     Cancel: C. trachomatis/N. gonorrhoeae by AMP DNA [facility]; Urine; Future; Expected date: 05/09/2024  -     C. trachomatis/N. gonorrhoeae by AMP DNA [facility]; Urine; Future; Expected  date: 05/09/2024    Need for hepatitis C screening test  -     HEPATITIS C ANTIBODY; Future; Expected date: 05/09/2024    Tobacco use  -     varenicline (CHANTIX STARTING MONTH BOX) 0.5 mg (11)- 1 mg (42) tablet; Take one 0.5mg tab by mouth once daily X3 days,then increase to one 0.5mg tab twice daily X4 days,then increase to one 1mg tab twice daily  Dispense: 1 each; Refill: 0  -     Ambulatory referral/consult to Smoking Cessation Program; Future; Expected date: 05/16/2024    Need for diphtheria-tetanus-pertussis (Tdap) vaccine  -     DIPH,PERTUSS(ACEL),TET VAC(PF)(ADULT)(ADACEL)(TDaP)    Need for pneumococcal 20-valent conjugate vaccination  -     pneumoc 20-song conj-dip cr(PF) (PREVNAR-20 (PF)) injection Syrg 0.5 mL    Follow up labs.  Continue meds.  Interested in smoking cessation.  Given Chantix in place referral to smoking program.          Future Appointments   Date Time Provider Department Center   6/3/2024  3:30 PM Gaby Suarez PA-C [facility] [place]       Patient note was created using [employer].  Any errors in syntax or even information may not have been identified and edited on initial review prior to signing this note.

## 2024-06-03 ENCOUNTER — OFFICE VISIT (OUTPATIENT)
Dept: FAMILY MEDICINE | Facility: CLINIC | Age: 34
End: 2024-06-03
Payer: COMMERCIAL

## 2024-06-03 VITALS
TEMPERATURE: 98 F | WEIGHT: 228.19 LBS | RESPIRATION RATE: 18 BRPM | OXYGEN SATURATION: 96 % | SYSTOLIC BLOOD PRESSURE: 138 MMHG | DIASTOLIC BLOOD PRESSURE: 82 MMHG | BODY MASS INDEX: 43.08 KG/M2 | HEART RATE: 100 BPM | HEIGHT: 61 IN

## 2024-06-03 DIAGNOSIS — M54.50 LOW BACK PAIN, UNSPECIFIED BACK PAIN LATERALITY, UNSPECIFIED CHRONICITY, UNSPECIFIED WHETHER SCIATICA PRESENT: Primary | ICD-10-CM

## 2024-06-03 PROCEDURE — 99999 PR PBB SHADOW E&M-EST. PATIENT-LVL IV: CPT | Mod: PBBFAC,,,

## 2024-06-03 PROCEDURE — 1159F MED LIST DOCD IN RCRD: CPT | Mod: CPTII,S$GLB,,

## 2024-06-03 PROCEDURE — 3079F DIAST BP 80-89 MM HG: CPT | Mod: CPTII,S$GLB,,

## 2024-06-03 PROCEDURE — 3075F SYST BP GE 130 - 139MM HG: CPT | Mod: CPTII,S$GLB,,

## 2024-06-03 PROCEDURE — 99213 OFFICE O/P EST LOW 20 MIN: CPT | Mod: S$GLB,,,

## 2024-06-03 PROCEDURE — 3008F BODY MASS INDEX DOCD: CPT | Mod: CPTII,S$GLB,,

## 2024-06-03 PROCEDURE — 1160F RVW MEDS BY RX/DR IN RCRD: CPT | Mod: CPTII,S$GLB,,

## 2024-06-03 RX ORDER — NICOTINE POLACRILEX 2 MG/1
LOZENGE ORAL
COMMUNITY
Start: 2024-05-20

## 2024-06-03 RX ORDER — CYCLOBENZAPRINE HCL 5 MG
5 TABLET ORAL 3 TIMES DAILY PRN
Qty: 30 TABLET | Refills: 0 | Status: SHIPPED | OUTPATIENT
Start: 2024-06-03 | End: 2024-06-13

## 2024-06-03 RX ORDER — MELOXICAM 7.5 MG/1
7.5 TABLET ORAL DAILY
Qty: 60 TABLET | Refills: 0 | Status: SHIPPED | OUTPATIENT
Start: 2024-06-03

## 2024-06-03 NOTE — LETTER
Penny 3, 2024      [facility]  [address] [place] [address]  Phone: [phone]  Fax: [phone]       Patient: Tulio Egan   YOB: 1990  Date of Visit: 06/03/2024    To Whom It May Concern:    Billy Egan  was at [facility] on 06/03/2024. The patient may return to work/school on 6/6/2024 with no restrictions. If you have any questions or concerns, or if I can be of further assistance, please do not hesitate to contact me.    Sincerely,    Gaby Suarez PA-C

## 2024-06-03 NOTE — PROGRESS NOTES
HPI     Chief Complaint:  Chief Complaint   Patient presents with    Back Pain       Tulio Egan is a 33 y.o. male with multiple medical diagnoses as listed in the medical history and problem list that presents for back pain.     Back Pain  The current episode started in the past 7 days. The quality of the pain is described as aching. The pain radiates to the left foot and right foot. The pain is at a severity of 7/10. The pain is moderate. The pain is The same all the time. The symptoms are aggravated by bending. Pertinent negatives include no abdominal pain, bladder incontinence, bowel incontinence, chest pain, fever, headaches, leg pain, numbness, pelvic pain, tingling or weakness. He has tried NSAIDs, bed rest and heat (mom's gabapentin) for the symptoms. The treatment provided mild relief.   Works with water meters. Bends down a lot.  Four days ago when he bent down to get back up the pain started.  Denies loss of bowel function, loss of urinary function, fall or external injury to back.          Assessment & Plan   Provided a work excuse with the patient.  Patient needed labs scheduled.  This was scheduled for tomorrow morning.  Instructed patient to take Flexeril at night or in the evening when he is home before bedtime.  Do not take if about the operate a vehicle.  Meloxicam can be taken daytime  Back exercises provided on AVS      Problem List Items Addressed This Visit          Orthopedic    Low back pain - Primary    Relevant Medications    cyclobenzaprine (FLEXERIL) 5 MG tablet    meloxicam (MOBIC) 7.5 MG tablet         --------------------------------------------      Health Maintenance:  Health Maintenance         Date Due Completion Date    Hepatitis C Screening Never done ---    HIV Screening Never done ---    COVID-19 Vaccine (1 - 2023-24 season) Never done ---    Influenza Vaccine (Season Ended) 09/01/2024 ---    TETANUS VACCINE 05/09/2034 5/9/2024            Health maintenance  "reviewed    Follow Up:  Follow up if symptoms worsen or fail to improve.    Exam     Review of Systems:  (as noted above)  Review of Systems   Constitutional:  Negative for fever.   Eyes:  Negative for visual disturbance.   Respiratory:  Negative for shortness of breath.    Cardiovascular:  Negative for chest pain.   Gastrointestinal:  Negative for abdominal pain and bowel incontinence.   Genitourinary:  Negative for bladder incontinence, difficulty urinating, flank pain and pelvic pain.   Musculoskeletal:  Positive for back pain.   Skin:  Negative for rash.   Neurological:  Negative for tingling, weakness, numbness and headaches.       Physical Exam:   Physical Exam  Constitutional:       General: He is not in acute distress.     Appearance: He is not ill-appearing, toxic-appearing or diaphoretic.   HENT:      Head: Normocephalic and atraumatic.   Cardiovascular:      Rate and Rhythm: Normal rate and regular rhythm.      Pulses: Normal pulses.      Heart sounds: Normal heart sounds. No murmur heard.  Pulmonary:      Effort: Pulmonary effort is normal. No respiratory distress.      Breath sounds: Normal breath sounds.   Musculoskeletal:      Right lower leg: No edema.      Left lower leg: No edema.      Comments: Pain reproducible with palpation to paraspinal muscles of the lower spine.  No rash no erythema.  Range of motion intact with pain   Skin:     Findings: No bruising, erythema or rash.   Neurological:      Mental Status: He is alert and oriented to person, place, and time.   Psychiatric:         Mood and Affect: Mood normal.       Vitals:    06/03/24 1533   BP: 138/82   BP Location: Right arm   Patient Position: Sitting   BP Method: Large (Manual)   Pulse: 100   Resp: 18   Temp: 98.2 °F (36.8 °C)   TempSrc: Oral   SpO2: 96%   Weight: 103.5 kg (228 lb 2.8 oz)   Height: 5' 1" (1.549 m)      Body mass index is 43.11 kg/m².        History     Past Medical History:  No past medical history on file.    Past " Surgical History:  No past surgical history on file.    Social History:  Social History     Socioeconomic History    Marital status: Single   Tobacco Use    Smoking status: Every Day     Types: Cigarettes     Passive exposure: Never    Smokeless tobacco: Never     Social Determinants of Health     Financial Resource Strain: Low Risk  (6/1/2024)    Overall Financial Resource Strain (CARDIA)     Difficulty of Paying Living Expenses: Not hard at all   Food Insecurity: No Food Insecurity (6/1/2024)    Hunger Vital Sign     Worried About Running Out of Food in the Last Year: Never true     Ran Out of Food in the Last Year: Never true   Transportation Needs: No Transportation Needs (3/20/2023)    Received from [facility] [facility]    PRAPARE - Transportation     Lack of Transportation (Medical): No     Lack of Transportation (Non-Medical): No   Physical Activity: Sufficiently Active (6/1/2024)    Exercise Vital Sign     Days of Exercise per Week: 5 days     Minutes of Exercise per Session: 120 min   Stress: No Stress Concern Present (6/1/2024)    [employer] - Occupational Stress Questionnaire     Feeling of Stress : Not at all   Housing Stability: Unknown (6/1/2024)    Housing Stability Vital Sign     Unable to Pay for Housing in the Last Year: No       Family History:  No family history on file.    Allergies and Medications: (updated and reviewed)  Review of patient's allergies indicates:  No Known Allergies  Current Outpatient Medications   Medication Sig Dispense Refill    varenicline (CHANTIX STARTING MONTH BOX) 0.5 mg (11)- 1 mg (42) tablet Take one 0.5mg tab by mouth once daily X3 days,then increase to one 0.5mg tab twice daily X4 days,then increase to one 1mg tab twice daily 1 each 0    acetaminophen (TYLENOL) 500 MG tablet Take 1 tablet (500 mg total) by mouth every 6 (six) hours as needed for Temperature greater than or Pain. (Patient not taking: Reported on 6/3/2024) 20 tablet 0     buPROPion (WELLBUTRIN SR) 150 MG TBSR 12 hr tablet Take one tab daily for three days then twice daily x 7-12 weeks (Patient not taking: Reported on 6/3/2024)      cyclobenzaprine (FLEXERIL) 5 MG tablet Take 1 tablet (5 mg total) by mouth 3 (three) times daily as needed for Muscle spasms. 30 tablet 0    ibuprofen (ADVIL,MOTRIN) 600 MG tablet Take 1 tablet (600 mg total) by mouth every 6 (six) hours as needed for Pain. (Patient not taking: Reported on 6/3/2024) 20 tablet 0    ibuprofen (ADVIL,MOTRIN) 800 MG tablet Take 800 mg by mouth every 6 (six) hours as needed. (Patient not taking: Reported on 6/3/2024)      meloxicam (MOBIC) 7.5 MG tablet Take 1 tablet (7.5 mg total) by mouth once daily. 60 tablet 0    nicotine polacrilex 2 MG Lozg Take 1 lozenge (2 mg total) by mouth as needed (in place of a cigarette). (Patient not taking: Reported on 6/3/2024) 72 lozenge 0    nicotine, polacrilex, 2 mg lzmn Place under the tongue. (Patient not taking: Reported on 6/3/2024)      urea (CARMOL) 40 % Crea Apply topically 2 (two) times daily. (Patient not taking: Reported on 6/3/2024)       No current facility-administered medications for this visit.       Patient Care Team:  Hai Quan MD as PCP - General (Family Medicine)         - The patient is given an After Visit Summary that lists all medications with directions, allergies, education, orders placed during this encounter and follow-up instructions.      - I have reviewed the patient's medical information including past medical, family, and social history sections including the medications and allergies.      - We discussed the patient's current medications.     This note was created by combination of typed  and MModal dictation.  Transcription errors may be present.  If there are any questions, please contact me.       Gaby Suarez PA-C

## 2024-06-04 ENCOUNTER — CLINICAL SUPPORT (OUTPATIENT)
Dept: SMOKING CESSATION | Facility: CLINIC | Age: 34
End: 2024-06-04

## 2024-06-04 ENCOUNTER — LAB VISIT (OUTPATIENT)
Dept: LAB | Facility: HOSPITAL | Age: 34
End: 2024-06-04
Attending: FAMILY MEDICINE
Payer: COMMERCIAL

## 2024-06-04 DIAGNOSIS — Z11.59 NEED FOR HEPATITIS C SCREENING TEST: ICD-10-CM

## 2024-06-04 DIAGNOSIS — Z00.00 ANNUAL PHYSICAL EXAM: ICD-10-CM

## 2024-06-04 DIAGNOSIS — F17.200 NICOTINE DEPENDENCE: Primary | ICD-10-CM

## 2024-06-04 DIAGNOSIS — Z11.3 SCREEN FOR STD (SEXUALLY TRANSMITTED DISEASE): ICD-10-CM

## 2024-06-04 LAB
ALBUMIN SERPL BCP-MCNC: 4 G/DL (ref 3.5–5.2)
ALP SERPL-CCNC: 55 U/L (ref 55–135)
ALT SERPL W/O P-5'-P-CCNC: 31 U/L (ref 10–44)
ANION GAP SERPL CALC-SCNC: 10 MMOL/L (ref 8–16)
AST SERPL-CCNC: 23 U/L (ref 10–40)
BASOPHILS # BLD AUTO: 0.05 K/UL (ref 0–0.2)
BASOPHILS NFR BLD: 1 % (ref 0–1.9)
BILIRUB SERPL-MCNC: 0.3 MG/DL (ref 0.1–1)
BUN SERPL-MCNC: 7 MG/DL (ref 6–20)
CALCIUM SERPL-MCNC: 10 MG/DL (ref 8.7–10.5)
CHLORIDE SERPL-SCNC: 103 MMOL/L (ref 95–110)
CHOLEST SERPL-MCNC: 112 MG/DL (ref 120–199)
CHOLEST/HDLC SERPL: 2.6 {RATIO} (ref 2–5)
CO2 SERPL-SCNC: 24 MMOL/L (ref 23–29)
CREAT SERPL-MCNC: 0.9 MG/DL (ref 0.5–1.4)
DIFFERENTIAL METHOD BLD: ABNORMAL
EOSINOPHIL # BLD AUTO: 0.2 K/UL (ref 0–0.5)
EOSINOPHIL NFR BLD: 4.9 % (ref 0–8)
ERYTHROCYTE [DISTWIDTH] IN BLOOD BY AUTOMATED COUNT: 14.4 % (ref 11.5–14.5)
EST. GFR  (NO RACE VARIABLE): >60 ML/MIN/1.73 M^2
ESTIMATED AVG GLUCOSE: 105 MG/DL (ref 68–131)
GLUCOSE SERPL-MCNC: 95 MG/DL (ref 70–110)
HBA1C MFR BLD: 5.3 % (ref 4–5.6)
HCT VFR BLD AUTO: 46 % (ref 40–54)
HCV AB SERPL QL IA: NORMAL
HDLC SERPL-MCNC: 43 MG/DL (ref 40–75)
HDLC SERPL: 38.4 % (ref 20–50)
HGB BLD-MCNC: 14.8 G/DL (ref 14–18)
HIV 1+2 AB+HIV1 P24 AG SERPL QL IA: NORMAL
IMM GRANULOCYTES # BLD AUTO: 0.01 K/UL (ref 0–0.04)
IMM GRANULOCYTES NFR BLD AUTO: 0.2 % (ref 0–0.5)
LDLC SERPL CALC-MCNC: 45 MG/DL (ref 63–159)
LYMPHOCYTES # BLD AUTO: 2 K/UL (ref 1–4.8)
LYMPHOCYTES NFR BLD: 41.2 % (ref 18–48)
MCH RBC QN AUTO: 27.8 PG (ref 27–31)
MCHC RBC AUTO-ENTMCNC: 32.2 G/DL (ref 32–36)
MCV RBC AUTO: 86 FL (ref 82–98)
MONOCYTES # BLD AUTO: 0.8 K/UL (ref 0.3–1)
MONOCYTES NFR BLD: 15.8 % (ref 4–15)
NEUTROPHILS # BLD AUTO: 1.8 K/UL (ref 1.8–7.7)
NEUTROPHILS NFR BLD: 36.9 % (ref 38–73)
NONHDLC SERPL-MCNC: 69 MG/DL
NRBC BLD-RTO: 0 /100 WBC
PLATELET # BLD AUTO: 219 K/UL (ref 150–450)
PMV BLD AUTO: 10.5 FL (ref 9.2–12.9)
POTASSIUM SERPL-SCNC: 4.4 MMOL/L (ref 3.5–5.1)
PROT SERPL-MCNC: 7.6 G/DL (ref 6–8.4)
RBC # BLD AUTO: 5.33 M/UL (ref 4.6–6.2)
SODIUM SERPL-SCNC: 137 MMOL/L (ref 136–145)
TREPONEMA PALLIDUM IGG+IGM AB [PRESENCE] IN SERUM OR PLASMA BY IMMUNOASSAY: NONREACTIVE
TRIGL SERPL-MCNC: 120 MG/DL (ref 30–150)
TSH SERPL DL<=0.005 MIU/L-ACNC: 0.55 UIU/ML (ref 0.4–4)
WBC # BLD AUTO: 4.88 K/UL (ref 3.9–12.7)

## 2024-06-04 PROCEDURE — 36415 COLL VENOUS BLD VENIPUNCTURE: CPT | Mod: PO | Performed by: FAMILY MEDICINE

## 2024-06-04 PROCEDURE — 80061 LIPID PANEL: CPT | Performed by: FAMILY MEDICINE

## 2024-06-04 PROCEDURE — 87389 HIV-1 AG W/HIV-1&-2 AB AG IA: CPT | Performed by: FAMILY MEDICINE

## 2024-06-04 PROCEDURE — 83036 HEMOGLOBIN GLYCOSYLATED A1C: CPT | Performed by: FAMILY MEDICINE

## 2024-06-04 PROCEDURE — 84443 ASSAY THYROID STIM HORMONE: CPT | Performed by: FAMILY MEDICINE

## 2024-06-04 PROCEDURE — 99999 PR PBB SHADOW E&M-EST. PATIENT-LVL I: CPT | Mod: PBBFAC,,,

## 2024-06-04 PROCEDURE — 86593 SYPHILIS TEST NON-TREP QUANT: CPT | Performed by: FAMILY MEDICINE

## 2024-06-04 PROCEDURE — 85025 COMPLETE CBC W/AUTO DIFF WBC: CPT | Performed by: FAMILY MEDICINE

## 2024-06-04 PROCEDURE — 86803 HEPATITIS C AB TEST: CPT | Performed by: FAMILY MEDICINE

## 2024-06-04 PROCEDURE — 80053 COMPREHEN METABOLIC PANEL: CPT | Performed by: FAMILY MEDICINE

## 2024-06-04 RX ORDER — VARENICLINE TARTRATE 1 MG/1
1 TABLET, FILM COATED ORAL 2 TIMES DAILY
Qty: 56 TABLET | Refills: 0 | Status: SHIPPED | OUTPATIENT
Start: 2024-06-04

## 2024-06-04 NOTE — PROGRESS NOTES
Individual Follow-Up Form    6/4/2024    Quit Date:     Clinical Status of Patient: Outpatient    Length of Service: 45 minutes    Continuing Medication: yes  Chantix    Other Medications: none     Target Symptoms: Withdrawal and medication side effects. The following were  rated moderate (3) to severe (4) on TCRS:  Moderate (3): none  Severe (4): none    Comments: Patient was seen in clinic today and reports smoking a pack of cigarettes per day. He remains on a tobacco cessation medication regimen of 1mg Chantix BID. Refill sent to pharmacy. No abnormal behaviors or mental changes reported at this time. Patient states that he was able to get down under a half pack but then once injuring himself at work and being home for the last week, he increased back to a pack. Patient reports that he spends most of his time at his mother's home, who is a heavy smoker who also smokes within her home. Suggested that when patient is there, he leave the cigarettes in the car and go out to grab one to smoke. Also discussed the harms of menthol cigarettes with patient. Reviewed strategies, cues, and triggers. Introduced the negative impact of tobacco on health, the health advantages of discontinuing the use of tobacco, time line improved health changes after a quit, withdrawal issues to expect from nicotine and habit, and ways to achieve the goal of a quit. The patient will continue with  therapy sessions and medication monitoring by CTTS. Prescribed medication management will be by physician.     Diagnosis: F17.200    Next Visit: 6/27/2024

## 2024-08-01 DIAGNOSIS — M54.50 LOW BACK PAIN, UNSPECIFIED BACK PAIN LATERALITY, UNSPECIFIED CHRONICITY, UNSPECIFIED WHETHER SCIATICA PRESENT: ICD-10-CM

## 2024-08-01 RX ORDER — MELOXICAM 7.5 MG/1
7.5 TABLET ORAL DAILY
Qty: 60 TABLET | Refills: 0 | Status: SHIPPED | OUTPATIENT
Start: 2024-08-01

## 2024-08-01 NOTE — TELEPHONE ENCOUNTER
No care due was identified.  [facility] Embedded Care Due Messages. Reference number: [id number].   8/01/2024 7:40:02 AM CDT

## 2024-08-14 ENCOUNTER — CLINICAL SUPPORT (OUTPATIENT)
Dept: SMOKING CESSATION | Facility: CLINIC | Age: 34
End: 2024-08-14

## 2024-08-14 DIAGNOSIS — F17.200 NICOTINE DEPENDENCE: Primary | ICD-10-CM

## 2024-08-14 PROCEDURE — 99999 PR PBB SHADOW E&M-EST. PATIENT-LVL I: CPT | Mod: PBBFAC,,,

## 2024-08-14 NOTE — PROGRESS NOTES
Called pt to f/u on his 3 month smoking cessation quit status. Pt stated he is still smoking. Informed him he has benefits available and is able to continue with program. Pt not interested in scheduling at this time. Informed him of benefit period, phone follow ups, and contact information. Will complete smart form and will continue to follow up on quit #1 episode.

## 2024-11-19 ENCOUNTER — CLINICAL SUPPORT (OUTPATIENT)
Dept: SMOKING CESSATION | Facility: CLINIC | Age: 34
End: 2024-11-19

## 2024-11-19 DIAGNOSIS — F17.200 NICOTINE DEPENDENCE: Primary | ICD-10-CM

## 2024-11-19 PROCEDURE — 99999 PR PBB SHADOW E&M-EST. PATIENT-LVL I: CPT | Mod: PBBFAC,,,

## 2024-11-19 NOTE — PROGRESS NOTES
Spoke with patient today in regard to smoking cessation progress for 6 month telephone follow up, he states not tobacco free.  Patient states not interested in returning to the program at this time.  Patient states he did well initially with smoking cessation medications, and then stopped.  Patient states he will work on reducing his alcohol intake and then consider contacting me to schedule an appointment.  Informed patient of benefit period, future follow up, and contact information if any further help or support is needed.  Will complete smart form for 6 month follow up on Quit attempt #1.

## 2025-03-30 DIAGNOSIS — Z72.0 TOBACCO USE: ICD-10-CM

## 2025-03-31 NOTE — TELEPHONE ENCOUNTER
Refill Routing Note   Medication(s) are not appropriate for processing by [facility] for the following reason(s):        Outside of protocol  Responsible provider unclear    [facility] action(s):  Route        Medication Therapy Plan: no pcp listed on profile      Appointments  past 12m or future 3m with PCP    Date Provider   Last Visit   5/9/2024 Hai Quan MD   Next Visit   Visit date not found Hai Quan MD   ED visits in past 90 days: 0        Note composed:9:48 AM 03/31/2025

## 2025-04-01 RX ORDER — VARENICLINE TARTRATE 0.5 (11)-1
KIT ORAL
Qty: 53 EACH | OUTPATIENT
Start: 2025-04-01

## 2025-05-26 ENCOUNTER — TELEPHONE (OUTPATIENT)
Dept: SMOKING CESSATION | Facility: CLINIC | Age: 35
End: 2025-05-26
Payer: COMMERCIAL